# Patient Record
Sex: FEMALE | Race: WHITE | Employment: FULL TIME | URBAN - METROPOLITAN AREA
[De-identification: names, ages, dates, MRNs, and addresses within clinical notes are randomized per-mention and may not be internally consistent; named-entity substitution may affect disease eponyms.]

---

## 2018-09-06 ENCOUNTER — OFFICE VISIT (OUTPATIENT)
Dept: ORTHOPEDIC SURGERY | Age: 38
End: 2018-09-06

## 2018-09-06 VITALS — BODY MASS INDEX: 39.24 KG/M2 | HEIGHT: 67 IN | WEIGHT: 250 LBS

## 2018-09-06 DIAGNOSIS — M25.561 ACUTE PAIN OF RIGHT KNEE: Primary | ICD-10-CM

## 2018-09-06 PROCEDURE — 99204 OFFICE O/P NEW MOD 45 MIN: CPT | Performed by: ORTHOPAEDIC SURGERY

## 2018-09-06 NOTE — PROGRESS NOTES
reveals all areas to be without enlargement or induration. Vascular: Examination reveals no swelling or calf tenderness. Peripheral pulses are palpable and 2+. Neurological: The patient has good coordination. There is no weakness or sensory deficit. Skin:    Head/Neck: inspection reveals no rashes, ulcerations or lesions. Trunk:  inspection reveals no rashes, ulcerations or lesions. Right Lower Extremity: inspection reveals no rashes, ulcerations or lesions. Left Lower Extremity: inspection reveals no rashes, ulcerations or lesions. PHYSICAL EXAM:      Knee Examination  Inspection:  No abrasions no lacerations no signs of infection or obvious deformity moderate obvious  swelling and joint effusion     Palpation:   Palpation reveals moderate  Pain along the medial joint line,   No lateral pain along the joint line ,  mild joint effusion noted today. Range of Motion:  0 - 150° flexion/ extension   Hip extension to 20 hip flexion to 70+  Lumbar ROM -20 extension flexion to 6 inches from the floor      Strength: Quadriceps testing 5/5 , hamstring muscle testing 5/5, EHL against resistance is 5/5, hip flexor strength is intact 5/5, internal and external rotation of the hip against resistance is also intact 5/5    Special Tests: stable Lachman, negative anterior drawer, negative pivot shift, no posterior sag no posterior drawer does not open to valgus or varus stress at 0 or 30° negative, Steinmann's negative, Sania's negative, Homans negative Jorgito, pedal pulses are +2/4 capillary refill is brisk sensation is intact ankle exam and hip exam are shows no pain with full range of motion provocative testing of the hip is nonpainful muscle testing around the hip is 5 over 5.   Lumbar flexion to 6 inches from floor with out pain    Gait: antalgic     Reflex:    Lower extremity Deep tendon reflexes +2/4 and equal bilaterally for patella and Achilles  Upper extremity reflexes:  of the biceps, triceps, brachioradialis +2/4 equal bilaterally    Contralateral  Knee: Negative Lachman negative anterior drawer negative pivot shift no posterior sag no posterior drawer does not open to valgus or varus stress at 0 or 30° negative Steinmann's negative Sania's negative Homans negative Jorgito pedal pulses are +2/4 capillary refill is brisk sensation is intact ankle exam and hip exam are shows no pain with full range of motion provocative testing of the hip is nonpainful muscle testing around the hip is 5 over 5. Lumbar exam:    L1: good strength of the iliopsoas muscle upper lateral thigh sensation is intact  L2: good strength of the iliopsoas muscle and medial epicondyle sensation is intact Lateral thigh sensation is intact  L3: good strength with out pain of obturator externus muscle sensation is intact to medial epicondyle of the femur   L4:Good quadratus strength and gluteus medius and minimus strength, sensation is intact to medial malleolus  L5:Intact Extensor hallucis and tibialis posterior strength, sensation is intact to dorsum of the foot. S1:  Plantar foot sensation is intact  S2:  Posterior thigh and calf sensation is intact      Hip and lumbar testing does not reproduce pain provocative testing does not reproduce the symptomatology. Additional Examinations:  Left Lower Extremity: Examination of the left lower extremity does not show any tenderness, deformity or injury. Range of motion is unremarkable. There is no gross instability. There are no rashes, ulcerations or lesions. Strength and tone are normal.  Right Upper Extremity:  Examination of the right upper extremity does not show any tenderness, deformity or injury. Range of motion is unremarkable. There is no gross instability. There are no rashes, ulcerations or lesions. Strength and tone are normal.  Left Upper Extremity: Examination of the left upper extremity does not show any tenderness, deformity or injury.   Range of motion is

## 2018-09-13 ENCOUNTER — OFFICE VISIT (OUTPATIENT)
Dept: ORTHOPEDIC SURGERY | Age: 38
End: 2018-09-13

## 2018-09-13 VITALS
WEIGHT: 250 LBS | BODY MASS INDEX: 39.24 KG/M2 | SYSTOLIC BLOOD PRESSURE: 124 MMHG | DIASTOLIC BLOOD PRESSURE: 81 MMHG | HEIGHT: 67 IN

## 2018-09-13 DIAGNOSIS — M25.561 ACUTE PAIN OF RIGHT KNEE: Primary | ICD-10-CM

## 2018-09-13 DIAGNOSIS — S83.231D COMPLEX TEAR OF MEDIAL MENISCUS OF RIGHT KNEE AS CURRENT INJURY, SUBSEQUENT ENCOUNTER: ICD-10-CM

## 2018-09-13 PROBLEM — S83.231A COMPLEX TEAR OF MEDIAL MENISCUS OF RIGHT KNEE AS CURRENT INJURY: Status: ACTIVE | Noted: 2018-09-13

## 2018-09-13 PROCEDURE — 99214 OFFICE O/P EST MOD 30 MIN: CPT | Performed by: ORTHOPAEDIC SURGERY

## 2018-09-13 NOTE — PROGRESS NOTES
pain,  mild joint effusion    Range of Motion:  0 - 150° flexion/ extension   Hip extension to 20 hip flexion to 70+  Lumbar ROM -20 extension flexion to 6 inches from the floor      Strength: Quadriceps testing 5/5 , hamstring muscle testing 5/5, EHL against resistance is 5/5, hip flexor strength is intact 5/5, internal and external rotation of the hip against resistance is also intact 5/5    Special Tests: stable Lachman, negative anterior drawer, negative pivot shift, no posterior sag no posterior drawer does not open to valgus or varus stress at 0 or 30° positive, Steinmann's positive, Sania's negative, Homans negative Jorgito, pedal pulses are +2/4 capillary refill is brisk sensation is intact ankle exam and hip exam are shows no pain with full range of motion provocative testing of the hip is nonpainful muscle testing around the hip is 5 over 5. Lumbar flexion to 6 inches from floor with out pain      Inspection:      Gait: antalgic     Reflex:    Lower extremity Deep tendon reflexes +2/4 and equal bilaterally for patella and Achilles  Upper extremity reflexes:  of the biceps, triceps, brachioradialis +2/4 equal bilaterally    Contralateral  Knee: Negative Lachman negative anterior drawer negative pivot shift no posterior sag no posterior drawer does not open to valgus or varus stress at 0 or 30° negative Steinmann's negative Sania's negative Homans negative Jorgito pedal pulses are +2/4 capillary refill is brisk sensation is intact ankle exam and hip exam are shows no pain with full range of motion provocative testing of the hip is nonpainful muscle testing around the hip is 5 over 5. Hip and lumbar testing does not reproduce pain evocative testing does not reproduce symptomatology. Additional Examinations:  Left Lower Extremity: Examination of the left lower extremity does not show any tenderness, deformity or injury. Range of motion is unremarkable. There is no gross instability.   There are no rashes, ulcerations or lesions. Strength and tone are normal.  Right Upper Extremity:  Examination of the right upper extremity does not show any tenderness, deformity or injury. Range of motion is unremarkable. There is no gross instability. There are no rashes, ulcerations or lesions. Strength and tone are normal.  Left Upper Extremity: Examination of the left upper extremity does not show any tenderness, deformity or injury. Range of motion is unremarkable. There is no gross instability. There are no rashes, ulcerations or lesions. Strength and tone are normal.  Lower Back: Examination of the lower back does not show any tenderness, deformity or injury. Range of motion is unremarkable. There is no gross instability. There are no rashes, ulcerations or lesions. Strength and tone are normal.    History reviewed. No pertinent surgical history. .    Radiology:     X-rays reviewed in office:  I independently reviewed the films in the office today. Views MRI multiple views  Body Part right knee  Impression medial meniscus tear      Assessment :  Right knee medial meniscus tear    Impression: Right knee medial meniscus tear    Office Procedures:  No orders of the defined types were placed in this encounter. Previous Treatments:  X-ray, MRI, she had a knee arthroscopy back in 1996    Possible other diagnoses:      Treatment Plan:  I spent 15+ minutes, face to face, with the patient discussing and answering questions regarding the risks, benefits, and complications of arthroscopic knee surgery. The patient realizes that there are concerns with this surgery with respect to infection, deep vein thrombosis, neurological injury, delayed  rehabilitation, the possibility of arthrofibrosis of the knee, and specifically  Hoffa's fat pad fibrosis that can potentially cause difficulties.  The patient realizes that there are also anesthetic concerns including cardiopulmonary issues, pulmonary issues, and

## 2018-09-18 ENCOUNTER — TELEPHONE (OUTPATIENT)
Dept: ORTHOPEDIC SURGERY | Age: 38
End: 2018-09-18

## 2018-09-20 DIAGNOSIS — S83.231D COMPLEX TEAR OF MEDIAL MENISCUS OF RIGHT KNEE AS CURRENT INJURY, SUBSEQUENT ENCOUNTER: Primary | ICD-10-CM

## 2018-09-20 RX ORDER — MELOXICAM 15 MG/1
15 TABLET ORAL DAILY
Qty: 30 TABLET | Refills: 3 | Status: SHIPPED | OUTPATIENT
Start: 2018-09-25 | End: 2018-09-24 | Stop reason: ALTCHOICE

## 2018-09-20 RX ORDER — HYDROCODONE BITARTRATE AND ACETAMINOPHEN 5; 325 MG/1; MG/1
1 TABLET ORAL EVERY 6 HOURS PRN
Qty: 28 TABLET | Refills: 0 | Status: SHIPPED | OUTPATIENT
Start: 2018-09-25 | End: 2018-09-24 | Stop reason: ALTCHOICE

## 2018-09-24 ENCOUNTER — ANESTHESIA EVENT (OUTPATIENT)
Dept: OPERATING ROOM | Age: 38
End: 2018-09-24
Payer: COMMERCIAL

## 2018-09-24 RX ORDER — ACETAMINOPHEN 325 MG/1
650 TABLET ORAL EVERY 4 HOURS PRN
Status: CANCELLED | OUTPATIENT
Start: 2018-09-24

## 2018-09-24 RX ORDER — DULOXETIN HYDROCHLORIDE 60 MG/1
60 CAPSULE, DELAYED RELEASE ORAL DAILY
COMMUNITY
End: 2021-01-12

## 2018-09-24 RX ORDER — RANITIDINE 300 MG/1
300 TABLET ORAL NIGHTLY
COMMUNITY
End: 2021-01-12

## 2018-09-24 RX ORDER — LISINOPRIL AND HYDROCHLOROTHIAZIDE 20; 12.5 MG/1; MG/1
1 TABLET ORAL DAILY
COMMUNITY

## 2018-09-24 RX ORDER — CEFAZOLIN SODIUM 2 G/100ML
2 INJECTION, SOLUTION INTRAVENOUS
Status: CANCELLED | OUTPATIENT
Start: 2018-09-24 | End: 2018-09-24

## 2018-09-24 RX ORDER — ONDANSETRON 2 MG/ML
4 INJECTION INTRAMUSCULAR; INTRAVENOUS EVERY 6 HOURS PRN
Status: CANCELLED | OUTPATIENT
Start: 2018-09-24

## 2018-09-24 RX ORDER — DOCUSATE SODIUM 100 MG/1
100 CAPSULE, LIQUID FILLED ORAL 2 TIMES DAILY
Status: CANCELLED | OUTPATIENT
Start: 2018-09-24

## 2018-09-24 NOTE — PROGRESS NOTES
Name_______________________________________Printed:____________________  Date and time of surgery_9/25/18  0700__[facility]_____________________Arrival Time:_0545_______________   1. Do not eat or drink anything after 12 midnight (or____hours) prior to surgery. This includes no water, chewing gum or mints. 2. Take the following pills with a small sip of water on the morning of surgery__takes meds at hs ___________________________________________________   3. Aspirin, Ibuprofen, Advil, Naproxen, Vitamin E and other Anti-inflammatory products should be stopped for 5 days before surgery or as directed by your physician. 4. Check with your Doctor regarding stopping Plavix, Coumadin,Eliquis, Lovenox,Effient,Pradaxa,Xarelto, Fragmin or other blood thinners and follow their instructions. 5. Do not smoke, and do not drink any alcoholic beverages 24 hours prior to surgery. This includes NA Beer. Refrain from the usage of any recreational drugs. 6. You may brush your teeth and gargle the morning of surgery. DO NOT SWALLOW WATER   7. You MUST make arrangements for a responsible adult to stay on site while you are here and take you home after your surgery. You will not be allowed to leave alone or drive yourself home. It is strongly suggested someone stay with you the first 24 hrs. Your surgery will be cancelled if you do not have a ride home. 8. A parent/legal guardian must accompany a child scheduled for surgery and plan to stay at the hospital until the child is discharged. Please do not bring other children with you. 9. Please wear simple, loose fitting clothing to the hospital.  Juan Mattsonch not bring valuables (money, credit cards, checkbooks, etc.) Do not wear any makeup (including no eye makeup) or nail polish on your fingers or toes. 10. DO NOT wear any jewelry or piercings on day of surgery. All body piercing jewelry must be removed.              11. If you have ___dentures, they will be removed before going to the OR; we will provide you a container. If you wear ___contact lenses or ___glasses, they will be removed; please bring a case for them. 12. Please see your family doctor/pediatrician for a history & physical and/or concerning medications. Bring any test results/reports from your physician's office. PCP__________________Phone___________H&P Appt. Date________             13 If you  have a Living Will and Durable Power of  for Healthcare, please bring in a copy. 15. Notify your Surgeon if you develop any illness between now and surgery  time, cough, cold, fever, sore throat, nausea, vomiting, etc.  Please notify your surgeon if you experience dizziness, shortness of breath or blurred vision between now & the time of your surgery             15. DO NOT shave your operative site 96 hours prior to surgery. For face & neck surgery, men may use an electric razor 48 hours prior to surgery. 16. Shower the night before surgery with ___Antibacterial soap ___Hibiclens             17. To provide excellent care visitors will be limited to one in the room at any given time. 18.  Please bring picture ID and insurance card. 19.  Visit our web site for additional information:  [email or web address]              20.During flu season no children under the age of 15 are permitted in the hospital for the safety of all patients. 21. If you take a long acting insulin in the evening only  take half of your usual  dose the night  before your procedure              22. If you use a c-pap please bring DOS if staying overnight,             23.For your convenience [facility] has a pharmacy on site to fill your prescriptions. 24. If you use oxygen and have a portable tank please bring it  with you the DOS             25. Bring a complete list of all your medications with name and dose include any supplements.

## 2018-09-25 ENCOUNTER — HOSPITAL ENCOUNTER (OUTPATIENT)
Age: 38
Setting detail: OUTPATIENT SURGERY
Discharge: HOME | End: 2018-09-25
Attending: ORTHOPAEDIC SURGERY | Admitting: ORTHOPAEDIC SURGERY
Payer: COMMERCIAL

## 2018-09-25 ENCOUNTER — ANESTHESIA (OUTPATIENT)
Dept: OPERATING ROOM | Age: 38
End: 2018-09-25
Payer: COMMERCIAL

## 2018-09-25 VITALS
TEMPERATURE: 99.3 F | SYSTOLIC BLOOD PRESSURE: 124 MMHG | DIASTOLIC BLOOD PRESSURE: 85 MMHG | RESPIRATION RATE: 17 BRPM | BODY MASS INDEX: 42.91 KG/M2 | OXYGEN SATURATION: 100 % | WEIGHT: 273.38 LBS | HEIGHT: 67 IN | HEART RATE: 85 BPM

## 2018-09-25 VITALS — SYSTOLIC BLOOD PRESSURE: 104 MMHG | DIASTOLIC BLOOD PRESSURE: 54 MMHG | OXYGEN SATURATION: 96 %

## 2018-09-25 LAB
ANION GAP SERPL CALCULATED.3IONS-SCNC: 15 MMOL/L (ref 3–16)
BUN BLDV-MCNC: 15 MG/DL (ref 7–20)
CALCIUM SERPL-MCNC: 9 MG/DL (ref 8.3–10.6)
CHLORIDE BLD-SCNC: 102 MMOL/L (ref 99–110)
CO2: 20 MMOL/L (ref 21–32)
CREAT SERPL-MCNC: 0.8 MG/DL (ref 0.6–1.1)
GFR AFRICAN AMERICAN: >60
GFR NON-AFRICAN AMERICAN: >60
GLUCOSE BLD-MCNC: 119 MG/DL (ref 70–99)
HCG QUALITATIVE: NEGATIVE
HCT VFR BLD CALC: 40.9 % (ref 36–48)
HEMOGLOBIN: 14 G/DL (ref 12–16)
MCH RBC QN AUTO: 29.9 PG (ref 26–34)
MCHC RBC AUTO-ENTMCNC: 34.2 G/DL (ref 31–36)
MCV RBC AUTO: 87.5 FL (ref 80–100)
PDW BLD-RTO: 13.9 % (ref 12.4–15.4)
PLATELET # BLD: 263 K/UL (ref 135–450)
PMV BLD AUTO: 8.1 FL (ref 5–10.5)
POTASSIUM SERPL-SCNC: 3.7 MMOL/L (ref 3.5–5.1)
RBC # BLD: 4.67 M/UL (ref 4–5.2)
SODIUM BLD-SCNC: 137 MMOL/L (ref 136–145)
WBC # BLD: 8.9 K/UL (ref 4–11)

## 2018-09-25 PROCEDURE — 7100000001 HC PACU RECOVERY - ADDTL 15 MIN: Performed by: ORTHOPAEDIC SURGERY

## 2018-09-25 PROCEDURE — 80048 BASIC METABOLIC PNL TOTAL CA: CPT

## 2018-09-25 PROCEDURE — 6360000002 HC RX W HCPCS: Performed by: NURSE ANESTHETIST, CERTIFIED REGISTERED

## 2018-09-25 PROCEDURE — 3700000000 HC ANESTHESIA ATTENDED CARE: Performed by: ORTHOPAEDIC SURGERY

## 2018-09-25 PROCEDURE — 7100000010 HC PHASE II RECOVERY - FIRST 15 MIN: Performed by: ORTHOPAEDIC SURGERY

## 2018-09-25 PROCEDURE — 36415 COLL VENOUS BLD VENIPUNCTURE: CPT

## 2018-09-25 PROCEDURE — 7100000011 HC PHASE II RECOVERY - ADDTL 15 MIN: Performed by: ORTHOPAEDIC SURGERY

## 2018-09-25 PROCEDURE — 2709999900 HC NON-CHARGEABLE SUPPLY: Performed by: ORTHOPAEDIC SURGERY

## 2018-09-25 PROCEDURE — 2500000003 HC RX 250 WO HCPCS: Performed by: ORTHOPAEDIC SURGERY

## 2018-09-25 PROCEDURE — 93010 ELECTROCARDIOGRAM REPORT: CPT | Performed by: INTERNAL MEDICINE

## 2018-09-25 PROCEDURE — 7100000000 HC PACU RECOVERY - FIRST 15 MIN: Performed by: ORTHOPAEDIC SURGERY

## 2018-09-25 PROCEDURE — 3600000013 HC SURGERY LEVEL 3 ADDTL 15MIN: Performed by: ORTHOPAEDIC SURGERY

## 2018-09-25 PROCEDURE — 3600000003 HC SURGERY LEVEL 3 BASE: Performed by: ORTHOPAEDIC SURGERY

## 2018-09-25 PROCEDURE — 93005 ELECTROCARDIOGRAM TRACING: CPT | Performed by: ANESTHESIOLOGY

## 2018-09-25 PROCEDURE — 3700000001 HC ADD 15 MINUTES (ANESTHESIA): Performed by: ORTHOPAEDIC SURGERY

## 2018-09-25 PROCEDURE — 2580000003 HC RX 258: Performed by: ORTHOPAEDIC SURGERY

## 2018-09-25 PROCEDURE — 6360000002 HC RX W HCPCS: Performed by: ORTHOPAEDIC SURGERY

## 2018-09-25 PROCEDURE — 85027 COMPLETE CBC AUTOMATED: CPT

## 2018-09-25 PROCEDURE — 84703 CHORIONIC GONADOTROPIN ASSAY: CPT

## 2018-09-25 PROCEDURE — 2720000010 HC SURG SUPPLY STERILE: Performed by: ORTHOPAEDIC SURGERY

## 2018-09-25 RX ORDER — MIDAZOLAM HYDROCHLORIDE 1 MG/ML
INJECTION INTRAMUSCULAR; INTRAVENOUS PRN
Status: DISCONTINUED | OUTPATIENT
Start: 2018-09-25 | End: 2018-09-25 | Stop reason: SDUPTHER

## 2018-09-25 RX ORDER — HYDROMORPHONE HCL 110MG/55ML
0.5 PATIENT CONTROLLED ANALGESIA SYRINGE INTRAVENOUS EVERY 5 MIN PRN
Status: DISCONTINUED | OUTPATIENT
Start: 2018-09-25 | End: 2018-09-25 | Stop reason: HOSPADM

## 2018-09-25 RX ORDER — FENTANYL CITRATE 50 UG/ML
INJECTION, SOLUTION INTRAMUSCULAR; INTRAVENOUS PRN
Status: DISCONTINUED | OUTPATIENT
Start: 2018-09-25 | End: 2018-09-25 | Stop reason: SDUPTHER

## 2018-09-25 RX ORDER — LABETALOL HYDROCHLORIDE 5 MG/ML
5 INJECTION, SOLUTION INTRAVENOUS EVERY 10 MIN PRN
Status: DISCONTINUED | OUTPATIENT
Start: 2018-09-25 | End: 2018-09-25 | Stop reason: HOSPADM

## 2018-09-25 RX ORDER — HYDRALAZINE HYDROCHLORIDE 20 MG/ML
5 INJECTION INTRAMUSCULAR; INTRAVENOUS EVERY 10 MIN PRN
Status: DISCONTINUED | OUTPATIENT
Start: 2018-09-25 | End: 2018-09-25 | Stop reason: HOSPADM

## 2018-09-25 RX ORDER — SODIUM CHLORIDE, SODIUM LACTATE, POTASSIUM CHLORIDE, CALCIUM CHLORIDE 600; 310; 30; 20 MG/100ML; MG/100ML; MG/100ML; MG/100ML
INJECTION, SOLUTION INTRAVENOUS CONTINUOUS
Status: DISCONTINUED | OUTPATIENT
Start: 2018-09-25 | End: 2018-09-25 | Stop reason: HOSPADM

## 2018-09-25 RX ORDER — PROPOFOL 10 MG/ML
INJECTION, EMULSION INTRAVENOUS CONTINUOUS PRN
Status: DISCONTINUED | OUTPATIENT
Start: 2018-09-25 | End: 2018-09-25 | Stop reason: SDUPTHER

## 2018-09-25 RX ORDER — BUPIVACAINE HYDROCHLORIDE AND EPINEPHRINE 5; 5 MG/ML; UG/ML
INJECTION, SOLUTION PERINEURAL
Status: DISCONTINUED | OUTPATIENT
Start: 2018-09-25 | End: 2018-09-25 | Stop reason: HOSPADM

## 2018-09-25 RX ORDER — OXYCODONE HYDROCHLORIDE AND ACETAMINOPHEN 5; 325 MG/1; MG/1
1 TABLET ORAL
Status: DISCONTINUED | OUTPATIENT
Start: 2018-09-25 | End: 2018-09-25 | Stop reason: HOSPADM

## 2018-09-25 RX ORDER — ONDANSETRON 2 MG/ML
4 INJECTION INTRAMUSCULAR; INTRAVENOUS
Status: DISCONTINUED | OUTPATIENT
Start: 2018-09-25 | End: 2018-09-25 | Stop reason: HOSPADM

## 2018-09-25 RX ADMIN — PROPOFOL 140 MCG/KG/MIN: 10 INJECTION, EMULSION INTRAVENOUS at 07:22

## 2018-09-25 RX ADMIN — Medication 3 G: at 06:58

## 2018-09-25 RX ADMIN — SODIUM CHLORIDE, POTASSIUM CHLORIDE, SODIUM LACTATE AND CALCIUM CHLORIDE: 600; 310; 30; 20 INJECTION, SOLUTION INTRAVENOUS at 06:27

## 2018-09-25 RX ADMIN — MIDAZOLAM HYDROCHLORIDE 2 MG: 1 INJECTION, SOLUTION INTRAMUSCULAR; INTRAVENOUS at 07:11

## 2018-09-25 RX ADMIN — FENTANYL CITRATE 25 MCG: 50 INJECTION, SOLUTION INTRAMUSCULAR; INTRAVENOUS at 07:40

## 2018-09-25 RX ADMIN — FENTANYL CITRATE 50 MCG: 50 INJECTION, SOLUTION INTRAMUSCULAR; INTRAVENOUS at 07:27

## 2018-09-25 ASSESSMENT — PULMONARY FUNCTION TESTS
PIF_VALUE: 0

## 2018-09-25 ASSESSMENT — ENCOUNTER SYMPTOMS: SHORTNESS OF BREATH: 0

## 2018-09-25 ASSESSMENT — PAIN - FUNCTIONAL ASSESSMENT: PAIN_FUNCTIONAL_ASSESSMENT: 0-10

## 2018-09-25 NOTE — ANESTHESIA POSTPROCEDURE EVALUATION
Department of Anesthesiology  Postprocedure Note    Patient: Noel Jacques  MRN: [id number]  YOB: 1980  Date of evaluation: 9/25/2018  Time:  8:20 AM     Procedure Summary     Date:  09/25/18 Room / Location:  [facility] OR [address] [place] [address] ASC OR    Anesthesia Start:  0715 Anesthesia Stop:  0800    Procedure:  RIGHT KNEE ARTHROSCOPE, PARTIAL MEDIAL AND LATERAL MENISCECTOMY VERSUS REPAIR, SYNOVECTOMY, CHONDROPASTY, AND INDICATED PROCEDURES (Right ) Diagnosis:  (Z58.407J RIGHT KNEE MEDIAL MENISCUS TEAR)    Surgeon:  Amadou Blunt DO Responsible Provider:  Emilee Manuel MD    Anesthesia Type:  MAC ASA Status:  2          Anesthesia Type: MAC    [place] Phase I:      [place] Phase II:      Last vitals: Reviewed and per EMR flowsheets.        Anesthesia Post Evaluation    Patient location during evaluation: PACU  Patient participation: complete - patient participated  Level of consciousness: awake and alert  Airway patency: patent  Nausea & Vomiting: no nausea and no vomiting  Complications: no  Cardiovascular status: hemodynamically stable  Respiratory status: acceptable  Hydration status: stable

## 2018-09-25 NOTE — OP NOTE
[facility]       [address], [address] [facility]       Operative note by  Beto Santiago. Kavon Baca MS, DO  Orthopedic surgeon  Orthopedics sports Fellowship trained  Board-certified  Team Physician for [employer]                       Knee Arthroscopy      Patient Name:  Noel Jacques    YOB: 1980    Medical  Record number: [id number]    Account number: [de-identified]     Date Of Surgery: 9/25/2018    Date Of Dictation: 9/25/2018    Location: [address]    Surgeon: Dr. Keo Parra    Assistant: None    Anesthesia: Local with General    Indications:  Chronic pain not alleviated by conservative therapy, positive MRI, not improved with conservative care    Complications: None    Estimated blood loss: Minimal    Preoperative antibiotics: Given and documented in the chart        Preoperative diagnosis :  1. Right knee medial meniscus tear            Postoperative diagnosis :  1. Right knee medial meniscus tear  2. Right knee extensive hyperemic hypertrophic synovitis  3. 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 Right knee lateral meniscus tear           Procedure performed:  1. Right knee diagnostic arthroscopy  2. Right knee partial medial meniscectomy  3. Right knee hyperemic hypertrophic synovectomy  4. Right knee chondroplasty of the patella, trochlea both grade 3 articular wear  5. Right knee arthroscopic partial lateral meniscectomy               Procedure in detail:  Patient was seen and evaluated A history and physical was obtained a written consent was discussed and signed by the patient.   Following the anesthesia evaluation and discussion with the patient about the scheduled procedure and recovery along with postoperative follow-up plans the patient was brought back to the operative suite put on the operative initially put in the lateral portal and a shaver was brought into the medial portal using the shaver I remove the synovial tissue from the superior patellar pouch and then used the bipolar to cauterize any bleeding tissue. From the same portal I remove the synovium from the medial gutter patella trochlear area the anterior medial joint space part of the anterior lateral joint space in the intertrochlear area using both the shaver and the bipolar. I now changed the camera to the medial portal using the shaver in the lateral portal I remove the remainder of the synovium from the anterior joint space the anterior lateral joint space in the lateral gutter all the way back up to the superior patellar pouch and across into the patella trochlear area once this was all excised we used the bipolar to smooth off any tissue and cauterize any bleeding tissue. The chondral surface had loose hanging debris. A chondroplasty was performed. The chondral surface was was trimmed with a shaver down to a stable surface and then I used a bipolar to smooth off the edges. A chondroplasty was performed the patella, trochlea and medial femoral condyle the medial femoral condyle was the largest area larger than a true quarter-sized footprint the patella trochlear were less large probably half a centimeter both of them were grade 3 also. Seeing that the lateral meniscus was torn and it was evaluated and deemed nonrepairable. Upon probing and evaluating this I went ahead and used a straight duckbill biter and angled up biter to saucerize the lateral meniscus tear. I then used a shaver to clean off the loose pieces and trimmed off the edges followed by the bipolar to smooth this off. I went ahead and progress it was a nice stable construct.          Following the entire procedure as soft tissue was cauterized of any bleeding the posterior joint space the medial and lateral gutter were evaluated for any loose

## 2018-09-25 NOTE — PROGRESS NOTES
Pt arrived from OR to PACU bay 4. Report received from OR staff. Pt arouses easily to voice. Surgical dressing in place to right knee. 100% on 3L NC, NSR, VSS. Will continue to monitor.

## 2018-09-26 LAB
EKG ATRIAL RATE: 84 BPM
EKG DIAGNOSIS: NORMAL
EKG P AXIS: 46 DEGREES
EKG P-R INTERVAL: 162 MS
EKG Q-T INTERVAL: 364 MS
EKG QRS DURATION: 84 MS
EKG QTC CALCULATION (BAZETT): 430 MS
EKG R AXIS: 28 DEGREES
EKG T AXIS: 30 DEGREES
EKG VENTRICULAR RATE: 84 BPM

## 2018-09-28 DIAGNOSIS — S83.231D COMPLEX TEAR OF MEDIAL MENISCUS OF RIGHT KNEE AS CURRENT INJURY, SUBSEQUENT ENCOUNTER: Primary | ICD-10-CM

## 2018-10-01 ENCOUNTER — OFFICE VISIT (OUTPATIENT)
Dept: ORTHOPEDIC SURGERY | Age: 38
End: 2018-10-01

## 2018-10-01 VITALS — BODY MASS INDEX: 42.99 KG/M2 | WEIGHT: 273.88 LBS | HEIGHT: 67 IN

## 2018-10-01 DIAGNOSIS — S83.231D COMPLEX TEAR OF MEDIAL MENISCUS OF RIGHT KNEE AS CURRENT INJURY, SUBSEQUENT ENCOUNTER: Primary | ICD-10-CM

## 2018-10-01 DIAGNOSIS — M17.11 PRIMARY OSTEOARTHRITIS OF RIGHT KNEE: ICD-10-CM

## 2018-10-01 PROCEDURE — 99024 POSTOP FOLLOW-UP VISIT: CPT | Performed by: ORTHOPAEDIC SURGERY

## 2018-10-03 ENCOUNTER — HOSPITAL ENCOUNTER (OUTPATIENT)
Dept: PHYSICAL THERAPY | Age: 38
Setting detail: THERAPIES SERIES
Discharge: HOME | End: 2018-10-03
Payer: COMMERCIAL

## 2018-10-03 PROCEDURE — 97161 PT EVAL LOW COMPLEX 20 MIN: CPT

## 2018-10-03 PROCEDURE — 97110 THERAPEUTIC EXERCISES: CPT

## 2018-10-03 PROCEDURE — G8979 MOBILITY GOAL STATUS: HCPCS

## 2018-10-03 PROCEDURE — G8978 MOBILITY CURRENT STATUS: HCPCS

## 2018-10-03 PROCEDURE — 97016 VASOPNEUMATIC DEVICE THERAPY: CPT

## 2018-10-03 NOTE — PLAN OF CARE
Evaluation Complexity Justification  [] A history of present problem with:  [] no personal factors and/or comorbidities that impact the plan of care;  [x]1-2 personal factors and/or comorbidities that impact the plan of care  []3 personal factors and/or comorbidities that impact the plan of care  [] An examination of body systems using standardized tests and measures addressing any of the following: body structures and functions (impairments), activity limitations, and/or participation restrictions;:  [x] a total of 1-2 or more elements   [] a total of 3 or more elements   [] a total of 4 or more elements   [] A clinical presentation with:  [x] stable and/or uncomplicated characteristics   [] evolving clinical presentation with changing characteristics  [] unstable and unpredictable characteristics;   [] Clinical decision making of [x] low, [] moderate, [] high complexity using standardized patient assessment instrument and/or measurable assessment of functional outcome. [x] EVAL (LOW) 06962 (typically 30 minutes face-to-face)  [] EVAL (MOD) 89686 (typically 30 minutes face-to-face)  [] EVAL (HIGH) 48761 (typically 45 minutes face-to-face)  [] RE-EVAL     PLAN:   Frequency/Duration:  1-2 days per week for 8-10 Weeks:  Interventions:  [x]  Therapeutic exercise including: strength training, ROM, for Lower extremity and core   [x]  NMR activation and proprioception for LE, Glutes and Core   [x]  Manual therapy as indicated for LE, Hip and spine to include: Dry Needling/IASTM, STM, PROM, Gr I-IV mobilizations, manipulation. [x] Modalities as needed that may include: thermal agents, E-stim, Biofeedback, US, iontophoresis as indicated  [x] Patient education on joint protection, postural re-education, activity modification, progression of HEP. HEP instruction:(see scanned forms)    GOALS:  Patient stated goal: \"return to baseline\"     Therapist goals for Patient:   Short Term Goals: To be achieved in: 2 weeks  1.

## 2018-10-12 ENCOUNTER — HOSPITAL ENCOUNTER (OUTPATIENT)
Dept: PHYSICAL THERAPY | Age: 38
Setting detail: THERAPIES SERIES
Discharge: HOME | End: 2018-10-12
Payer: COMMERCIAL

## 2018-10-26 ENCOUNTER — HOSPITAL ENCOUNTER (OUTPATIENT)
Dept: PHYSICAL THERAPY | Age: 38
Setting detail: THERAPIES SERIES
Discharge: HOME | End: 2018-10-26
Payer: COMMERCIAL

## 2021-01-12 ENCOUNTER — OFFICE VISIT (OUTPATIENT)
Dept: ORTHOPEDIC SURGERY | Age: 41
End: 2021-01-12
Payer: COMMERCIAL

## 2021-01-12 VITALS — BODY MASS INDEX: 38.57 KG/M2 | HEIGHT: 66 IN | TEMPERATURE: 96.4 F | WEIGHT: 240 LBS

## 2021-01-12 DIAGNOSIS — M25.512 LEFT SHOULDER PAIN, UNSPECIFIED CHRONICITY: Primary | ICD-10-CM

## 2021-01-12 DIAGNOSIS — S46.012A TRAUMATIC TEAR OF LEFT ROTATOR CUFF, UNSPECIFIED TEAR EXTENT, INITIAL ENCOUNTER: ICD-10-CM

## 2021-01-12 PROCEDURE — 99214 OFFICE O/P EST MOD 30 MIN: CPT | Performed by: ORTHOPAEDIC SURGERY

## 2021-01-12 RX ORDER — BUPROPION HYDROCHLORIDE 300 MG/1
300 TABLET ORAL EVERY MORNING
COMMUNITY

## 2021-01-12 RX ORDER — CETIRIZINE HYDROCHLORIDE 10 MG/1
10 TABLET ORAL DAILY
COMMUNITY

## 2021-01-12 NOTE — PROGRESS NOTES
Chief Complaint    Shoulder Pain (left shoulder)      History of Present Illness:  Rusty Ardon is a 36 y.o. female. She is here today for evaluation of her left shoulder. She is right-hand dominant. She injured her left shoulder 6 weeks ago when she had a fall while she was walking her dogs. She tried to catch herself with her left hand on the ground when she fell. She had immediate pain within the left shoulder. She has had significant pain with any attempts of moving the arm away from her body. She has pain that does keep her awake at nighttime. She denies radicular pain or numbness or tingling. Medical History:  Patient's medications, allergies, past medical, surgical, social and family histories were reviewed and updated as appropriate. Review of Systems:  Pertinent items are noted in HPI  Review of systems reviewed from Patient History Form dated on 1/12/21 and available in the patient's chart under the Media tab. Vital Signs:  Temp 96.4 °F (35.8 °C)   Ht 5' 6\" (1.676 m)   Wt 240 lb (108.9 kg)   BMI 38.74 kg/m²     General Exam:   Constitutional: Patient is adequately groomed with no evidence of malnutrition  DTRs: Deep tendon reflexes are intact  Mental Status: The patient is oriented to time, place and person. The patient's mood and affect are appropriate. Lymphatic: The lymphatic examination bilaterally reveals all areas to be without enlargement or induration. Shoulder Examination:    Inspection: No significant swelling erythema noted at the left shoulder today    Palpation: There is tenderness palpation directly over the bicipital groove. There is tenderness over the [facility] joint    Range of Motion: Active forward elevation today is 90 degrees and painful. Passively we can get her to 160 degrees. External rotation is 70 degrees    Strength:  There is significant weakness with isolated supraspinatus testing as well as resisted external rotation Special Tests: She has a positive drop arm exam.  Pain weakness with Beauregard's active compression testing and speeds testing. There is some pain weakness noted with belly press testing    Skin: There are no rashes, ulcerations or lesions. Gait: Normal      Additional Comments:       Additional Examinations:         Right Upper Extremity:  Examination of the right upper extremity does not show any tenderness, deformity or injury. Range of motion is unremarkable. There is no gross instability. There are no rashes, ulcerations or lesions. Strength and tone are normal.  Thoracic Spine: Examination of the thoracic spine does not show any tenderness, deformity or injury. Range of motion is unremarkable. There is no gross instability. There are no rashes, ulcerations or lesions. Strength and tone are normal.  Neck: Examination of the neck does not show any tenderness, deformity or injury. Range of motion is unremarkable. There is no gross instability. There are no rashes, ulcerations or lesions. Strength and tone are normal.    Radiology:     X-rays obtained and reviewed in office:  Views 4 views left shoulder demonstrates no obvious fracture dislocation or other osseous abnormalities      Assessment : Left shoulder concern for rotator cuff tear    Impression:  Encounter Diagnoses   Name Primary?     Left shoulder pain, unspecified chronicity Yes    Traumatic tear of left rotator cuff, unspecified tear extent, initial encounter        Office Procedures:  Orders Placed This Encounter   Procedures    XR SHOULDER LEFT (MIN 2 VIEWS)     Standing Status:   Future     Number of Occurrences:   1     Standing Expiration Date:   1/12/2022 Treatment Plan: I discussed the diagnosis and treatment options with her today. Clinical exam is consistent with a traumatic rotator cuff tear. Would recommend at this time an MRI of the shoulder to rule out rotator cuff tear. She is agreeable with that plan. I will see her back once MRI is completed to go over those results.   She may continue with the use of anti-inflammatories in the meantime

## 2021-01-26 ENCOUNTER — OFFICE VISIT (OUTPATIENT)
Dept: ORTHOPEDIC SURGERY | Age: 41
End: 2021-01-26
Payer: COMMERCIAL

## 2021-01-26 DIAGNOSIS — M75.82 TENDINITIS OF LEFT ROTATOR CUFF: Primary | ICD-10-CM

## 2021-01-26 PROCEDURE — 20610 DRAIN/INJ JOINT/BURSA W/O US: CPT | Performed by: ORTHOPAEDIC SURGERY

## 2021-01-26 PROCEDURE — 99213 OFFICE O/P EST LOW 20 MIN: CPT | Performed by: ORTHOPAEDIC SURGERY

## 2021-01-26 RX ORDER — METHYLPREDNISOLONE ACETATE 40 MG/ML
80 INJECTION, SUSPENSION INTRA-ARTICULAR; INTRALESIONAL; INTRAMUSCULAR; SOFT TISSUE ONCE
Status: COMPLETED | OUTPATIENT
Start: 2021-01-26 | End: 2021-01-26

## 2021-01-26 RX ADMIN — METHYLPREDNISOLONE ACETATE 80 MG: 40 INJECTION, SUSPENSION INTRA-ARTICULAR; INTRALESIONAL; INTRAMUSCULAR; SOFT TISSUE at 08:42

## 2021-01-26 NOTE — PROGRESS NOTES
Right Upper Extremity:  Examination of the right upper extremity does not show any tenderness, deformity or injury. Range of motion is unremarkable. There is no gross instability. There are no rashes, ulcerations or lesions. Strength and tone are normal.    Radiology:     Narrative   [id number] #: [id number] Procedure: MR Left Shoulder joint w/o Contrast ; Reason for Exam: Left shoulder pain, rule out rotator cuff tear   This document is confidential medical information.  Unauthorized disclosure or use of this information is prohibited by law. If you are not the intended recipient of this document, please advise us by calling immediately [phone].       [facility] [facility]inridge[place]ino farrell   [address]           Patient Name: Virgie Farrell   Case ID: [id number]   Patient : 1980   Referring Physician: Carry Riedel, MD   Exam Date: 2021   Exam Description: MR Left Shoulder joint w/o Contrast            HISTORY:  Left shoulder pain.  Evaluate for rotator cuff tear.       TECHNICAL FACTORS:  Long- and short-axis fat- and water-weighted images were performed.       COMPARISON:  None.       FINDINGS:  Mild fraying/blunting of the posterosuperior labrum.  Biceps long head tendon    intact.  Proximal biceps tendon sheath distention.       Mildly laterally downsloping acromion.  AC joint intact.  Low-grade subacromial/subdeltoid    bursitis.  Mild rotator interval capsular swelling.       Mildly hypertrophic supraspinatus tendon strain/contusion without tear (coronal T2 SPAIR series    601, image 11).  Moderate infraspinatus tendon strain without tear.       Teres minor and subscapularis tendons intact.  No fracture.       CONCLUSION:   1. Mildly hypertrophic supraspinatus tendon strain without tear. 2. Moderate infraspinatus tendon strain without tear.            Assessment : Left shoulder rotator cuff tendinitis with rotator cuff strain Impression:  Encounter Diagnosis   Name Primary?  Tendinitis of left rotator cuff Yes       Office Procedures:  No orders of the defined types were placed in this encounter. Treatment Plan: I discussed the diagnosis and treatment options with her today. We did go through the results of her MRI. I would recommend today trial of a subacromial cortisone injection to see if we can reduce down her inflammation. In addition I am going to refer her to physical therapy to start working on strengthening and range of motion. I would like to see her back in clinic in 6 weeks if no improvement of the shoulder. Under sterile conditions left shoulder was injected through posterior approach into the subacromial space with 2 cc of 40 mg Depo-Medrol mixed with 3 cc of quarter percent Marcaine. She did tolerate the injection well.   Postinjection precautions were given

## 2022-06-15 ENCOUNTER — CLINICAL DOCUMENTATION (OUTPATIENT)
Dept: OTHER | Age: 42
End: 2022-06-15

## (undated) DEVICE — GLOVE SURG SZ 9 THK91MIL LTX FREE SYN POLYISOPRENE ANTI

## (undated) DEVICE — DEVON TUBE HOLDER REMOVABLE TOUCH FASTEN STRAP: Brand: DEVON

## (undated) DEVICE — HYPODERMIC SAFETY NEEDLE: Brand: MAGELLAN

## (undated) DEVICE — WEREWOLF FLOW 50 COBLATION WAND: Brand: COBLATION

## (undated) DEVICE — 3.5 MM FULL RADIUS ELITE STRAIGHT                                    DISPOSABLE BLADES, BEIGE,PACKAGED 6                                    PER BOX, STERILE

## (undated) DEVICE — FLEXIBLE ADHESIVE BANDAGE: Brand: CURITY

## (undated) DEVICE — Device

## (undated) DEVICE — GLOVE ORANGE PI 8 1/2   MSG9085

## (undated) DEVICE — MAT FLR ABS DISP

## (undated) DEVICE — ROOM TURNOVER KIT W/ ARM STRP

## (undated) DEVICE — SHEET,DRAPE,53X77,STERILE: Brand: MEDLINE

## (undated) DEVICE — SUTURE ETHLN SZ 4-0 L18IN NONABSORBABLE BLK L19MM PS-2 3/8 1667H

## (undated) DEVICE — 3M™ STERI-DRAPE™ U-DRAPE 1015: Brand: STERI-DRAPE™

## (undated) DEVICE — GAUZE,SPONGE,4"X4",8PLY,STRL,LF,10/TRAY: Brand: MEDLINE

## (undated) DEVICE — DYONICS 25 PATIENT TUBE SET MUST                                    BE USED WITH 7211007, 12 PER BOX

## (undated) DEVICE — APPLICATOR PREP 26ML 0.7% IOD POVACRYLEX 74% ISO ALC ST

## (undated) DEVICE — SYRINGE, LUER LOCK, 10ML: Brand: MEDLINE

## (undated) DEVICE — SYRINGE MED 50ML LUERLOCK TIP